# Patient Record
Sex: FEMALE | Race: WHITE | NOT HISPANIC OR LATINO | Employment: UNEMPLOYED | URBAN - METROPOLITAN AREA
[De-identification: names, ages, dates, MRNs, and addresses within clinical notes are randomized per-mention and may not be internally consistent; named-entity substitution may affect disease eponyms.]

---

## 2021-07-09 ENCOUNTER — HOSPITAL ENCOUNTER (EMERGENCY)
Facility: HOSPITAL | Age: 31
Discharge: HOME | End: 2021-07-09
Attending: EMERGENCY MEDICINE | Admitting: EMERGENCY MEDICINE

## 2021-07-09 VITALS
BODY MASS INDEX: 25.61 KG/M2 | RESPIRATION RATE: 18 BRPM | DIASTOLIC BLOOD PRESSURE: 81 MMHG | OXYGEN SATURATION: 100 % | WEIGHT: 150 LBS | HEART RATE: 79 BPM | TEMPERATURE: 98.4 F | SYSTOLIC BLOOD PRESSURE: 137 MMHG | HEIGHT: 64 IN

## 2021-07-09 DIAGNOSIS — H81.11 BENIGN PAROXYSMAL POSITIONAL VERTIGO OF RIGHT EAR: Primary | ICD-10-CM

## 2021-07-09 LAB
ALBUMIN SERPL BCP-MCNC: 3.9 G/DL (ref 3.5–5)
ALP SERPL-CCNC: 69 U/L (ref 46–116)
ALT SERPL W P-5'-P-CCNC: 15 U/L (ref 12–78)
ANION GAP SERPL CALCULATED.3IONS-SCNC: 9 MMOL/L (ref 4–13)
AST SERPL W P-5'-P-CCNC: 13 U/L (ref 5–45)
BASOPHILS # BLD AUTO: 0.03 THOUSANDS/ΜL (ref 0–0.1)
BASOPHILS NFR BLD AUTO: 0 % (ref 0–1)
BILIRUB SERPL-MCNC: 0.69 MG/DL (ref 0.2–1)
BUN SERPL-MCNC: 11 MG/DL (ref 5–25)
CALCIUM SERPL-MCNC: 8.7 MG/DL (ref 8.3–10.1)
CHLORIDE SERPL-SCNC: 105 MMOL/L (ref 100–108)
CO2 SERPL-SCNC: 25 MMOL/L (ref 21–32)
CREAT SERPL-MCNC: 0.61 MG/DL (ref 0.6–1.3)
EOSINOPHIL # BLD AUTO: 0.03 THOUSAND/ΜL (ref 0–0.61)
EOSINOPHIL NFR BLD AUTO: 0 % (ref 0–6)
ERYTHROCYTE [DISTWIDTH] IN BLOOD BY AUTOMATED COUNT: 12.9 % (ref 11.6–15.1)
EXT PREG TEST URINE: NEGATIVE
EXT. CONTROL ED NAV: NORMAL
GFR SERPL CREATININE-BSD FRML MDRD: 123 ML/MIN/1.73SQ M
GLUCOSE SERPL-MCNC: 103 MG/DL (ref 65–140)
HCT VFR BLD AUTO: 39.6 % (ref 34.8–46.1)
HGB BLD-MCNC: 13.1 G/DL (ref 11.5–15.4)
IMM GRANULOCYTES # BLD AUTO: 0.06 THOUSAND/UL (ref 0–0.2)
IMM GRANULOCYTES NFR BLD AUTO: 1 % (ref 0–2)
LYMPHOCYTES # BLD AUTO: 1.07 THOUSANDS/ΜL (ref 0.6–4.47)
LYMPHOCYTES NFR BLD AUTO: 8 % (ref 14–44)
MCH RBC QN AUTO: 31.5 PG (ref 26.8–34.3)
MCHC RBC AUTO-ENTMCNC: 33.1 G/DL (ref 31.4–37.4)
MCV RBC AUTO: 95 FL (ref 82–98)
MONOCYTES # BLD AUTO: 0.37 THOUSAND/ΜL (ref 0.17–1.22)
MONOCYTES NFR BLD AUTO: 3 % (ref 4–12)
NEUTROPHILS # BLD AUTO: 11.71 THOUSANDS/ΜL (ref 1.85–7.62)
NEUTS SEG NFR BLD AUTO: 88 % (ref 43–75)
NRBC BLD AUTO-RTO: 0 /100 WBCS
PLATELET # BLD AUTO: 277 THOUSANDS/UL (ref 149–390)
PMV BLD AUTO: 9.4 FL (ref 8.9–12.7)
POTASSIUM SERPL-SCNC: 4.4 MMOL/L (ref 3.5–5.3)
PROT SERPL-MCNC: 7.9 G/DL (ref 6.4–8.2)
RBC # BLD AUTO: 4.16 MILLION/UL (ref 3.81–5.12)
SODIUM SERPL-SCNC: 139 MMOL/L (ref 136–145)
TROPONIN I SERPL-MCNC: <0.02 NG/ML
WBC # BLD AUTO: 13.27 THOUSAND/UL (ref 4.31–10.16)

## 2021-07-09 PROCEDURE — 36415 COLL VENOUS BLD VENIPUNCTURE: CPT

## 2021-07-09 PROCEDURE — 93005 ELECTROCARDIOGRAM TRACING: CPT

## 2021-07-09 PROCEDURE — 99284 EMERGENCY DEPT VISIT MOD MDM: CPT | Performed by: EMERGENCY MEDICINE

## 2021-07-09 PROCEDURE — 84484 ASSAY OF TROPONIN QUANT: CPT | Performed by: EMERGENCY MEDICINE

## 2021-07-09 PROCEDURE — 99284 EMERGENCY DEPT VISIT MOD MDM: CPT

## 2021-07-09 PROCEDURE — 85025 COMPLETE CBC W/AUTO DIFF WBC: CPT | Performed by: EMERGENCY MEDICINE

## 2021-07-09 PROCEDURE — 80053 COMPREHEN METABOLIC PANEL: CPT | Performed by: EMERGENCY MEDICINE

## 2021-07-09 PROCEDURE — 81025 URINE PREGNANCY TEST: CPT | Performed by: EMERGENCY MEDICINE

## 2021-07-09 RX ORDER — MECLIZINE HCL 12.5 MG/1
12.5 TABLET ORAL 3 TIMES DAILY PRN
Qty: 15 TABLET | Refills: 0 | Status: SHIPPED | OUTPATIENT
Start: 2021-07-09

## 2021-07-09 RX ORDER — MECLIZINE HCL 12.5 MG/1
25 TABLET ORAL ONCE
Status: COMPLETED | OUTPATIENT
Start: 2021-07-09 | End: 2021-07-09

## 2021-07-09 RX ADMIN — MECLIZINE 25 MG: 12.5 TABLET ORAL at 17:40

## 2021-07-09 NOTE — ED PROVIDER NOTES
History  Chief Complaint   Patient presents with    Dizziness     Pt reports dizziness, NV since this am  Denies CP/SOB/HA/fevers        History provided by:  Patient and parent  Dizziness  Quality:  Room spinning  Severity:  Severe  Onset quality:  Sudden  Duration:  10 hours  Timing:  Constant  Progression:  Improving  Chronicity:  New  Context comment:  Woke up this morning stood up, says walking to the bathroom sudden-onset dizziness with sensation of room spinning  Relieved by:  Being still  Worsened by: Movement and turning head (Particularly turning her head to the right)  Associated symptoms: vomiting    Associated symptoms: no chest pain, no diarrhea, no headaches, no nausea, no palpitations and no shortness of breath        None       History reviewed  No pertinent past medical history  Past Surgical History:   Procedure Laterality Date     SECTION         History reviewed  No pertinent family history  I have reviewed and agree with the history as documented  E-Cigarette/Vaping     E-Cigarette/Vaping Substances     Social History     Tobacco Use    Smoking status: Never Smoker    Smokeless tobacco: Never Used   Substance Use Topics    Alcohol use: Not on file    Drug use: Never       Review of Systems   Constitutional: Negative for activity change, chills, diaphoresis and fever  HENT: Negative for congestion, sinus pressure and sore throat  Eyes: Negative for pain and visual disturbance  Respiratory: Negative for cough, chest tightness, shortness of breath, wheezing and stridor  Cardiovascular: Negative for chest pain and palpitations  Gastrointestinal: Positive for vomiting  Negative for abdominal distention, abdominal pain, constipation, diarrhea and nausea  Genitourinary: Negative for dysuria and frequency  Musculoskeletal: Negative for neck pain and neck stiffness  Skin: Negative for rash  Neurological: Positive for dizziness   Negative for speech difficulty, light-headedness, numbness and headaches  Physical Exam  Physical Exam  Vitals reviewed  Constitutional:       General: She is not in acute distress  Appearance: She is well-developed  She is not diaphoretic  HENT:      Head: Normocephalic and atraumatic  Right Ear: External ear normal       Left Ear: External ear normal       Nose: Nose normal    Eyes:      General:         Right eye: No discharge  Left eye: No discharge  Pupils: Pupils are equal, round, and reactive to light  Neck:      Trachea: No tracheal deviation  Cardiovascular:      Rate and Rhythm: Normal rate and regular rhythm  Heart sounds: Normal heart sounds  No murmur heard  Pulmonary:      Effort: Pulmonary effort is normal  No respiratory distress  Breath sounds: Normal breath sounds  No stridor  Abdominal:      General: There is no distension  Palpations: Abdomen is soft  Tenderness: There is no abdominal tenderness  There is no guarding or rebound  Musculoskeletal:         General: Normal range of motion  Cervical back: Normal range of motion and neck supple  Skin:     General: Skin is warm and dry  Coloration: Skin is not pale  Findings: No erythema  Neurological:      General: No focal deficit present  Mental Status: She is alert and oriented to person, place, and time        Comments: [place]-Hallpike maneuver positive to the right         Vital Signs  ED Triage Vitals   Temperature Pulse Respirations Blood Pressure SpO2   07/09/21 1621 07/09/21 1621 07/09/21 1621 07/09/21 1621 07/09/21 1621   98 4 °F (36 9 °C) 79 18 137/81 100 %      Temp Source Heart Rate Source Patient Position - Orthostatic VS BP Location FiO2 (%)   07/09/21 1621 07/09/21 1621 07/09/21 1621 07/09/21 1621 --   Oral Monitor Sitting Left arm       Pain Score       07/09/21 1622       No Pain           Vitals:    07/09/21 1621   BP: 137/81   Pulse: 79   Patient Position - Orthostatic VS: Sitting Visual Acuity  Visual Acuity      Most Recent Value   L Pupil Size (mm)  3   R Pupil Size (mm)  3          ED Medications  Medications   meclizine (ANTIVERT) tablet 25 mg (25 mg Oral Given 7/9/21 1740)       Diagnostic Studies  Results Reviewed     Procedure Component Value Units Date/Time    POCT pregnancy, urine [[id number]]  (Normal) Resulted: 07/09/21 1730    Lab Status: Final result Updated: 07/09/21 1730     EXT PREG TEST UR (Ref: Negative) negative     Control valid    Comprehensive metabolic panel [[id number]] Collected: 07/09/21 1624    Lab Status: Final result Specimen: Blood from Arm, Right Updated: 07/09/21 1657     Sodium 139 mmol/L      Potassium 4 4 mmol/L      Chloride 105 mmol/L      CO2 25 mmol/L      ANION GAP 9 mmol/L      BUN 11 mg/dL      Creatinine 0 61 mg/dL      Glucose 103 mg/dL      Calcium 8 7 mg/dL      AST 13 U/L      ALT 15 U/L      Alkaline Phosphatase 69 U/L      Total Protein 7 9 g/dL      Albumin 3 9 g/dL      Total Bilirubin 0 69 mg/dL      eGFR 123 ml/min/1 73sq m     Narrative:      Jef guidelines for Chronic Kidney Disease (CKD):     Stage 1 with normal or high GFR (GFR > 90 mL/min/1 73 square meters)    Stage 2 Mild CKD (GFR = 60-89 mL/min/1 73 square meters)    Stage 3A Moderate CKD (GFR = 45-59 mL/min/1 73 square meters)    Stage 3B Moderate CKD (GFR = 30-44 mL/min/1 73 square meters)    Stage 4 Severe CKD (GFR = 15-29 mL/min/1 73 square meters)    Stage 5 End Stage CKD (GFR <15 mL/min/1 73 square meters)  Note: GFR calculation is accurate only with a steady state creatinine    Troponin I [[id number]]  (Normal) Collected: 07/09/21 1624    Lab Status: Final result Specimen: Blood from Arm, Right Updated: 07/09/21 1653     Troponin I <0 02 ng/mL     CBC and differential [[id number]]  (Abnormal) Collected: 07/09/21 1624    Lab Status: Final result Specimen: Blood from Arm, Right Updated: 07/09/21 1631     WBC 13 27 Thousand/uL      RBC 4 16 Million/uL      Hemoglobin 13 1 g/dL      Hematocrit 39 6 %      MCV 95 fL      MCH 31 5 pg      MCHC 33 1 g/dL      RDW 12 9 %      MPV 9 4 fL      Platelets 703 Thousands/uL      nRBC 0 /100 WBCs      Neutrophils Relative 88 %      Immat GRANS % 1 %      Lymphocytes Relative 8 %      Monocytes Relative 3 %      Eosinophils Relative 0 %      Basophils Relative 0 %      Neutrophils Absolute 11 71 Thousands/µL      Immature Grans Absolute 0 06 Thousand/uL      Lymphocytes Absolute 1 07 Thousands/µL      Monocytes Absolute 0 37 Thousand/µL      Eosinophils Absolute 0 03 Thousand/µL      Basophils Absolute 0 03 Thousands/µL                  No orders to display              Procedures  Procedures         ED Course                             SBIRT 20yo+      Most Recent Value   SBIRT (24 yo +)   In order to provide better care to our patients, we are screening all of our patients for alcohol and drug use  Would it be okay to ask you these screening questions? Unable to answer at this time Filed at: 07/09/2021 1503                    [facility]  Number of Diagnoses or Management Options  Benign paroxysmal positional vertigo of right ear: new and requires workup  Diagnosis management comments: [place]-Hallpike maneuver positive to the right, fully reproduces symptoms, will discharge on Antivert         Amount and/or Complexity of Data Reviewed  Clinical lab tests: ordered and reviewed  Decide to obtain previous medical records or to obtain history from someone other than the patient: yes  Obtain history from someone other than the patient: yes  Review and summarize past medical records: yes        Disposition  Final diagnoses:   Benign paroxysmal positional vertigo of right ear     Time reflects when diagnosis was documented in both MDM as applicable and the Disposition within this note     Time User Action Codes Description Comment    7/9/2021  5:31 PM Luisito Matute Add [H81 11] Benign paroxysmal positional vertigo of right ear       ED Disposition     ED Disposition Condition Date/Time Comment    Discharge Stable Fri Jul 9, 2021  5:31 PM Leti Barrios discharge to home/self care  Follow-up Information     Follow up With Specialties Details Why Contact Info Additional Information    Physical Therapy at [address] [place] [address] Physical Therapy Call  If symptoms worsen for vestibular therapy Lawrence Lal 77  [phone] Physical Therapy at [address] [place] [address], [facility] [address]          Discharge Medication List as of 7/9/2021  5:33 PM      START taking these medications    Details   meclizine (ANTIVERT) 12 5 MG tablet Take 1 tablet (12 5 mg total) by mouth 3 (three) times a day as needed for dizziness, Starting Fri 7/9/2021, Print           No discharge procedures on file      PDMP Review     None          ED Provider  Electronically Signed by           Aria Castillo DO  07/09/21 2046

## 2021-07-11 LAB
ATRIAL RATE: 87 BPM
P AXIS: 52 DEGREES
PR INTERVAL: 114 MS
QRS AXIS: 76 DEGREES
QRSD INTERVAL: 72 MS
QT INTERVAL: 342 MS
QTC INTERVAL: 402 MS
T WAVE AXIS: 52 DEGREES
VENTRICULAR RATE: 83 BPM

## 2021-07-11 PROCEDURE — 93010 ELECTROCARDIOGRAM REPORT: CPT | Performed by: INTERNAL MEDICINE

## 2021-08-04 ENCOUNTER — OFFICE VISIT (OUTPATIENT)
Dept: FAMILY MEDICINE CLINIC | Facility: CLINIC | Age: 31
End: 2021-08-04

## 2021-08-04 VITALS
OXYGEN SATURATION: 98 % | SYSTOLIC BLOOD PRESSURE: 115 MMHG | HEART RATE: 79 BPM | WEIGHT: 158.2 LBS | HEIGHT: 64 IN | TEMPERATURE: 98.2 F | BODY MASS INDEX: 27.01 KG/M2 | RESPIRATION RATE: 20 BRPM | DIASTOLIC BLOOD PRESSURE: 80 MMHG

## 2021-08-04 DIAGNOSIS — R42 VERTIGO: ICD-10-CM

## 2021-08-04 DIAGNOSIS — Z11.4 ENCOUNTER FOR SCREENING FOR HIV: ICD-10-CM

## 2021-08-04 DIAGNOSIS — E78.2 MIXED HYPERLIPIDEMIA: ICD-10-CM

## 2021-08-04 DIAGNOSIS — Z11.59 ENCOUNTER FOR HEPATITIS C SCREENING TEST FOR LOW RISK PATIENT: ICD-10-CM

## 2021-08-04 DIAGNOSIS — R53.83 OTHER FATIGUE: ICD-10-CM

## 2021-08-04 DIAGNOSIS — R73.9 HYPERGLYCEMIA: ICD-10-CM

## 2021-08-04 DIAGNOSIS — Z12.4 PAP SMEAR FOR CERVICAL CANCER SCREENING: ICD-10-CM

## 2021-08-04 DIAGNOSIS — H61.21 IMPACTED CERUMEN OF RIGHT EAR: Primary | ICD-10-CM

## 2021-08-04 PROBLEM — Z23 NEED FOR TDAP VACCINATION: Status: ACTIVE | Noted: 2021-08-04

## 2021-08-04 PROCEDURE — 99204 OFFICE O/P NEW MOD 45 MIN: CPT | Performed by: NURSE PRACTITIONER

## 2021-08-04 NOTE — ASSESSMENT & PLAN NOTE
Based on symptoms I am recommending movements that help relieve the symptoms and keep the dizziness from returning  These exercises help move the calcium pieces to a different part of the patients ear  Advised patient to avoid sudden head movements and raise and support their head when they lie down  Also recommended to change position often when she is lying down in bed

## 2021-08-04 NOTE — ASSESSMENT & PLAN NOTE
The primary prevention discussed with patient is to avoid using cotton-tipped applicators in ears due to unintended consequence of pushing cerumen back and causing an impaction  Discussed use of ear buds or hearing aids can also cause this to recur      -given debrox drops and return for re-evaluation

## 2021-08-04 NOTE — PROGRESS NOTES
BMI Counseling: Body mass index is 27 15 kg/m²  The BMI is above normal  Nutrition recommendations include encouraging healthy choices of fruits and vegetables, decreasing fast food intake, consuming healthier snacks, limiting drinks that contain sugar, increasing intake of lean protein, reducing intake of saturated and trans fat and reducing intake of cholesterol  Exercise recommendations include exercising 3-5 times per week  Depression Screening and Follow-up Plan: Patient's depression screening was positive with a PHQ-2 score of 0  Clincally patient does not have depression  No treatment is required  Assessment/Plan:    Impacted cerumen of right ear  The primary prevention discussed with patient is to avoid using cotton-tipped applicators in ears due to unintended consequence of pushing cerumen back and causing an impaction  Discussed use of ear buds or hearing aids can also cause this to recur      -given debrox drops and return for re-evaluation  Vertigo  Based on symptoms I am recommending movements that help relieve the symptoms and keep the dizziness from returning  These exercises help move the calcium pieces to a different part of the patients ear  Advised patient to avoid sudden head movements and raise and support their head when they lie down  Also recommended to change position often when she is lying down in bed  Diagnoses and all orders for this visit:    Impacted cerumen of right ear  -     carbamide peroxide (DEBROX) 6 5 % otic solution; Administer 5 drops to the right ear 2 (two) times a day for 4 days    Vertigo    Pap smear for cervical cancer screening  -     Ambulatory referral to Gynecology; Future    Encounter for screening for HIV    Encounter for hepatitis C screening test for low risk patient    Other fatigue  -     CBC and differential; Future  -     TSH, 3rd generation with Free T4 reflex; Future    Hyperglycemia  -     Comprehensive metabolic panel;  Future    Mixed hyperlipidemia  -     Lipid panel; Future    Other orders  -     Cancel: Tdap Vaccine greater than or equal to 6yo  -     Cancel: HIV 1/2 Antigen/Antibody (4th Generation) w Reflex [facility]; Future  -     Cancel: Hepatitis C antibody; Future          Subjective:      Patient ID: Lawrence Judge is a 36 y o  female  36year old female patient here to establish care  Did not have a PCP in the past  PMHx: vertigo  States she was in the ER due to vertigo and was given meclizine but it makes her sleepy  Pt states when lying states she gets more vertigo or when she is laying down on the side  The sensation in her stomach like itchy she states  Denies headaches  Admits to feeling like she has water in her ears and has to come out is what she feels  Admits to having allergies  Does not take anti-histamines everyday  Dizziness  This is a recurrent problem  The current episode started more than 1 month ago  The problem occurs intermittently  The problem has been waxing and waning  Associated symptoms include nausea and vertigo  Pertinent negatives include no change in bowel habit, chest pain, congestion, coughing, headaches, sore throat or vomiting  The symptoms are aggravated by twisting and bending (laying down)  Treatments tried: meclzine  The treatment provided significant relief  The following portions of the patient's history were reviewed and updated as appropriate: allergies, current medications, past family history, past medical history, past social history, past surgical history and problem list     Review of Systems   Constitutional: Negative  HENT: Negative  Negative for congestion and sore throat  Eyes: Negative  Respiratory: Negative  Negative for cough, chest tightness and wheezing  Cardiovascular: Negative  Negative for chest pain  Gastrointestinal: Positive for nausea  Negative for change in bowel habit and vomiting  Endocrine: Negative  Genitourinary: Negative  Musculoskeletal: Negative  Skin: Negative  Allergic/Immunologic: Negative  Neurological: Positive for dizziness and vertigo  Negative for headaches  Hematological: Negative  Psychiatric/Behavioral: Negative  Objective:      /80 (BP Location: Left arm, Patient Position: Sitting, Cuff Size: Standard)   Pulse 79   Temp 98 2 °F (36 8 °C) (Temporal)   Resp 20   Ht 5' 4" (1 626 m)   Wt 71 8 kg (158 lb 3 2 oz)   LMP 07/19/2021 (Exact Date)   SpO2 98%   BMI 27 15 kg/m²          Physical Exam  Vitals and nursing note reviewed  Constitutional:       Appearance: Normal appearance  She is well-developed  HENT:      Head: Normocephalic and atraumatic  Right Ear: Hearing and external ear normal  No decreased hearing noted  There is impacted cerumen  Left Ear: Hearing, tympanic membrane, ear canal and external ear normal  No decreased hearing noted  Nose: Nose normal       Mouth/Throat:      Pharynx: Oropharynx is clear  Eyes:      Conjunctiva/sclera: Conjunctivae normal    Cardiovascular:      Rate and Rhythm: Normal rate and regular rhythm  Heart sounds: Normal heart sounds  Pulmonary:      Effort: Pulmonary effort is normal  No respiratory distress  Breath sounds: Normal breath sounds  No wheezing or rales  Musculoskeletal:         General: Normal range of motion  Cervical back: Normal range of motion and neck supple  Lymphadenopathy:      Cervical: No cervical adenopathy  Skin:     General: Skin is warm and dry  Neurological:      Mental Status: She is alert and oriented to person, place, and time  Deep Tendon Reflexes: Reflexes are normal and symmetric     Psychiatric:         Mood and Affect: Mood normal          Behavior: Behavior normal

## 2021-08-10 ENCOUNTER — OFFICE VISIT (OUTPATIENT)
Dept: FAMILY MEDICINE CLINIC | Facility: CLINIC | Age: 31
End: 2021-08-10

## 2021-08-10 VITALS
RESPIRATION RATE: 20 BRPM | HEART RATE: 92 BPM | SYSTOLIC BLOOD PRESSURE: 118 MMHG | HEIGHT: 64 IN | WEIGHT: 160.2 LBS | TEMPERATURE: 97.1 F | DIASTOLIC BLOOD PRESSURE: 70 MMHG | OXYGEN SATURATION: 98 % | BODY MASS INDEX: 27.35 KG/M2

## 2021-08-10 DIAGNOSIS — R42 VERTIGO: Primary | ICD-10-CM

## 2021-08-10 DIAGNOSIS — H61.21 IMPACTED CERUMEN OF RIGHT EAR: ICD-10-CM

## 2021-08-10 PROCEDURE — 99213 OFFICE O/P EST LOW 20 MIN: CPT | Performed by: NURSE PRACTITIONER

## 2021-08-10 NOTE — ASSESSMENT & PLAN NOTE
-pt verbalized using debrox drops and seeing cerumen after use  Continues to have spells of dizziness  I advised her CBC was normal and she is not anemic  To practice vertigo exercises we discussed  On exam both ears were normal  No signs of infection or fluid  I recommended f/u with ENT  Referral provided today

## 2021-08-10 NOTE — ASSESSMENT & PLAN NOTE
-pt to continue to practice anti-vertigo exercises as instructed  Her ear exam was unremarkable post cerumen removal with debrox drops  Pt states meclizine givein in ER did not work so she stopped this  She can follow up with ENT  Referral provided

## 2021-08-10 NOTE — PROGRESS NOTES
Assessment/Plan:    Impacted cerumen of right ear  -pt verbalized using debrox drops and seeing cerumen after use  Continues to have spells of dizziness  I advised her CBC was normal and she is not anemic  To practice vertigo exercises we discussed  On exam both ears were normal  No signs of infection or fluid  I recommended f/u with ENT  Referral provided today  Vertigo  -pt to continue to practice anti-vertigo exercises as instructed  Her ear exam was unremarkable post cerumen removal with debrox drops  Pt states meclizine givein in ER did not work so she stopped this  She can follow up with ENT  Referral provided  Diagnoses and all orders for this visit:    Vertigo  -     Ambulatory Referral to Otolaryngology; Future    Impacted cerumen of right ear          Subjective:      Patient ID: Alisson Palafox is a 36 y o  female  36year old female patient here for follow up on cerumen impaction  States she used the irrigation OTC and on Thursday the cerumen came out, states it was a big piece of ear wax  States she still is feeling vertigo even after irrigation  No change per patient  Denies any tinnitus  She is hearing well without complaints  She did not use meclizine ER gave her as this does not work for her  Would like to know what is the cause of her off balance  The following portions of the patient's history were reviewed and updated as appropriate: allergies, current medications, past family history, past medical history, past social history, past surgical history and problem list     Review of Systems   Constitutional: Negative  Negative for appetite change and fever  HENT: Negative  Eyes: Negative  Respiratory: Negative  Negative for cough, chest tightness, shortness of breath and wheezing  Cardiovascular: Negative  Negative for chest pain and palpitations  Gastrointestinal: Negative  Endocrine: Negative  Genitourinary: Negative      Musculoskeletal: Negative  Skin: Negative  Allergic/Immunologic: Negative  Neurological: Positive for dizziness  Negative for headaches  Hematological: Negative  Psychiatric/Behavioral: Negative  Objective:      /70 (BP Location: Left arm, Patient Position: Sitting, Cuff Size: Standard)   Pulse 92   Temp (!) 97 1 °F (36 2 °C) (Temporal)   Resp 20   Ht 5' 4" (1 626 m)   Wt 72 7 kg (160 lb 3 2 oz)   LMP 07/19/2021 (Exact Date)   SpO2 98%   BMI 27 50 kg/m²          Physical Exam  Vitals and nursing note reviewed  Constitutional:       General: She is not in acute distress  Appearance: Normal appearance  She is not ill-appearing  HENT:      Head: Normocephalic and atraumatic  Right Ear: Tympanic membrane, ear canal and external ear normal  There is no impacted cerumen  Left Ear: Tympanic membrane, ear canal and external ear normal  There is no impacted cerumen  Nose: Nose normal  No congestion or rhinorrhea  Mouth/Throat:      Pharynx: Oropharynx is clear  No oropharyngeal exudate or posterior oropharyngeal erythema  Eyes:      Conjunctiva/sclera: Conjunctivae normal    Cardiovascular:      Rate and Rhythm: Normal rate and regular rhythm  Pulses: Normal pulses  Heart sounds: Normal heart sounds  No murmur heard  No friction rub  Pulmonary:      Effort: Pulmonary effort is normal  No respiratory distress  Breath sounds: Normal breath sounds  Musculoskeletal:         General: No tenderness  Normal range of motion  Cervical back: Normal range of motion and neck supple  No rigidity  No muscular tenderness  Skin:     General: Skin is warm and dry  Capillary Refill: Capillary refill takes less than 2 seconds  Neurological:      General: No focal deficit present  Mental Status: She is alert and oriented to person, place, and time     Psychiatric:         Mood and Affect: Mood normal          Behavior: Behavior normal

## 2022-10-12 PROBLEM — H61.21 IMPACTED CERUMEN OF RIGHT EAR: Status: RESOLVED | Noted: 2021-08-04 | Resolved: 2022-10-12

## 2023-11-12 ENCOUNTER — HOSPITAL ENCOUNTER (EMERGENCY)
Facility: HOSPITAL | Age: 33
Discharge: HOME | End: 2023-11-12
Attending: EMERGENCY MEDICINE

## 2023-11-12 ENCOUNTER — APPOINTMENT (EMERGENCY)
Dept: RADIOLOGY | Facility: HOSPITAL | Age: 33
End: 2023-11-12

## 2023-11-12 VITALS
OXYGEN SATURATION: 99 % | HEART RATE: 88 BPM | SYSTOLIC BLOOD PRESSURE: 128 MMHG | TEMPERATURE: 97.9 F | RESPIRATION RATE: 16 BRPM | DIASTOLIC BLOOD PRESSURE: 84 MMHG

## 2023-11-12 DIAGNOSIS — N30.91 HEMORRHAGIC CYSTITIS: Primary | ICD-10-CM

## 2023-11-12 LAB
ANION GAP SERPL CALCULATED.3IONS-SCNC: 5 MMOL/L
BACTERIA UR QL AUTO: ABNORMAL /HPF
BASOPHILS # BLD AUTO: 0.04 THOUSANDS/ÂΜL (ref 0–0.1)
BASOPHILS NFR BLD AUTO: 0 % (ref 0–1)
BILIRUB UR QL STRIP: NEGATIVE
BUN SERPL-MCNC: 9 MG/DL (ref 5–25)
CALCIUM SERPL-MCNC: 9.2 MG/DL (ref 8.4–10.2)
CHLORIDE SERPL-SCNC: 104 MMOL/L (ref 96–108)
CLARITY UR: ABNORMAL
CO2 SERPL-SCNC: 28 MMOL/L (ref 21–32)
COLOR UR: ABNORMAL
CREAT SERPL-MCNC: 0.66 MG/DL (ref 0.6–1.3)
EOSINOPHIL # BLD AUTO: 0.25 THOUSAND/ÂΜL (ref 0–0.61)
EOSINOPHIL NFR BLD AUTO: 2 % (ref 0–6)
ERYTHROCYTE [DISTWIDTH] IN BLOOD BY AUTOMATED COUNT: 12.6 % (ref 11.6–15.1)
EXT PREGNANCY TEST URINE: NEGATIVE
EXT. CONTROL: NORMAL
GFR SERPL CREATININE-BSD FRML MDRD: 118 ML/MIN/1.73SQ M
GLUCOSE SERPL-MCNC: 91 MG/DL (ref 65–140)
GLUCOSE UR STRIP-MCNC: NEGATIVE MG/DL
HCT VFR BLD AUTO: 37 % (ref 34.8–46.1)
HGB BLD-MCNC: 12.6 G/DL (ref 11.5–15.4)
HGB UR QL STRIP.AUTO: ABNORMAL
IMM GRANULOCYTES # BLD AUTO: 0.04 THOUSAND/UL (ref 0–0.2)
IMM GRANULOCYTES NFR BLD AUTO: 0 % (ref 0–2)
KETONES UR STRIP-MCNC: NEGATIVE MG/DL
LEUKOCYTE ESTERASE UR QL STRIP: ABNORMAL
LYMPHOCYTES # BLD AUTO: 3.02 THOUSANDS/ÂΜL (ref 0.6–4.47)
LYMPHOCYTES NFR BLD AUTO: 25 % (ref 14–44)
MCH RBC QN AUTO: 31.9 PG (ref 26.8–34.3)
MCHC RBC AUTO-ENTMCNC: 34.1 G/DL (ref 31.4–37.4)
MCV RBC AUTO: 94 FL (ref 82–98)
MONOCYTES # BLD AUTO: 0.6 THOUSAND/ÂΜL (ref 0.17–1.22)
MONOCYTES NFR BLD AUTO: 5 % (ref 4–12)
NEUTROPHILS # BLD AUTO: 8.08 THOUSANDS/ÂΜL (ref 1.85–7.62)
NEUTS SEG NFR BLD AUTO: 68 % (ref 43–75)
NITRITE UR QL STRIP: NEGATIVE
NON-SQ EPI CELLS URNS QL MICRO: ABNORMAL /HPF
NRBC BLD AUTO-RTO: 0 /100 WBCS
PH UR STRIP.AUTO: 6 [PH]
PLATELET # BLD AUTO: 297 THOUSANDS/UL (ref 149–390)
PMV BLD AUTO: 9.3 FL (ref 8.9–12.7)
POTASSIUM SERPL-SCNC: 3.7 MMOL/L (ref 3.5–5.3)
PROT UR STRIP-MCNC: ABNORMAL MG/DL
RBC # BLD AUTO: 3.95 MILLION/UL (ref 3.81–5.12)
RBC #/AREA URNS AUTO: ABNORMAL /HPF
SODIUM SERPL-SCNC: 137 MMOL/L (ref 135–147)
SP GR UR STRIP.AUTO: 1.02 (ref 1–1.03)
UROBILINOGEN UR STRIP-ACNC: <2 MG/DL
WBC # BLD AUTO: 12.03 THOUSAND/UL (ref 4.31–10.16)
WBC #/AREA URNS AUTO: ABNORMAL /HPF

## 2023-11-12 PROCEDURE — 80048 BASIC METABOLIC PNL TOTAL CA: CPT

## 2023-11-12 PROCEDURE — 87077 CULTURE AEROBIC IDENTIFY: CPT | Performed by: EMERGENCY MEDICINE

## 2023-11-12 PROCEDURE — 96365 THER/PROPH/DIAG IV INF INIT: CPT

## 2023-11-12 PROCEDURE — 96375 TX/PRO/DX INJ NEW DRUG ADDON: CPT

## 2023-11-12 PROCEDURE — 99284 EMERGENCY DEPT VISIT MOD MDM: CPT

## 2023-11-12 PROCEDURE — 36415 COLL VENOUS BLD VENIPUNCTURE: CPT

## 2023-11-12 PROCEDURE — 96366 THER/PROPH/DIAG IV INF ADDON: CPT

## 2023-11-12 PROCEDURE — 87086 URINE CULTURE/COLONY COUNT: CPT | Performed by: EMERGENCY MEDICINE

## 2023-11-12 PROCEDURE — 81025 URINE PREGNANCY TEST: CPT | Performed by: EMERGENCY MEDICINE

## 2023-11-12 PROCEDURE — 74177 CT ABD & PELVIS W/CONTRAST: CPT

## 2023-11-12 PROCEDURE — 85025 COMPLETE CBC W/AUTO DIFF WBC: CPT

## 2023-11-12 PROCEDURE — 81001 URINALYSIS AUTO W/SCOPE: CPT | Performed by: EMERGENCY MEDICINE

## 2023-11-12 PROCEDURE — G1004 CDSM NDSC: HCPCS

## 2023-11-12 PROCEDURE — 87186 SC STD MICRODIL/AGAR DIL: CPT | Performed by: EMERGENCY MEDICINE

## 2023-11-12 PROCEDURE — 99284 EMERGENCY DEPT VISIT MOD MDM: CPT | Performed by: EMERGENCY MEDICINE

## 2023-11-12 RX ORDER — SODIUM CHLORIDE, SODIUM GLUCONATE, SODIUM ACETATE, POTASSIUM CHLORIDE, MAGNESIUM CHLORIDE, SODIUM PHOSPHATE, DIBASIC, AND POTASSIUM PHOSPHATE .53; .5; .37; .037; .03; .012; .00082 G/100ML; G/100ML; G/100ML; G/100ML; G/100ML; G/100ML; G/100ML
1000 INJECTION, SOLUTION INTRAVENOUS ONCE
Status: COMPLETED | OUTPATIENT
Start: 2023-11-12 | End: 2023-11-12

## 2023-11-12 RX ORDER — CEFPODOXIME PROXETIL 200 MG/1
200 TABLET, FILM COATED ORAL ONCE
Status: DISCONTINUED | OUTPATIENT
Start: 2023-11-12 | End: 2023-11-12 | Stop reason: HOSPADM

## 2023-11-12 RX ORDER — KETOROLAC TROMETHAMINE 30 MG/ML
15 INJECTION, SOLUTION INTRAMUSCULAR; INTRAVENOUS ONCE
Status: COMPLETED | OUTPATIENT
Start: 2023-11-12 | End: 2023-11-12

## 2023-11-12 RX ORDER — CEFPODOXIME PROXETIL 200 MG/1
200 TABLET, FILM COATED ORAL 2 TIMES DAILY
Qty: 28 TABLET | Refills: 0 | Status: SHIPPED | OUTPATIENT
Start: 2023-11-12 | End: 2023-11-26

## 2023-11-12 RX ADMIN — IOHEXOL 100 ML: 350 INJECTION, SOLUTION INTRAVENOUS at 17:56

## 2023-11-12 RX ADMIN — KETOROLAC TROMETHAMINE 15 MG: 30 INJECTION, SOLUTION INTRAMUSCULAR; INTRAVENOUS at 17:02

## 2023-11-12 RX ADMIN — SODIUM CHLORIDE, SODIUM GLUCONATE, SODIUM ACETATE, POTASSIUM CHLORIDE, MAGNESIUM CHLORIDE, SODIUM PHOSPHATE, DIBASIC, AND POTASSIUM PHOSPHATE 1000 ML: .53; .5; .37; .037; .03; .012; .00082 INJECTION, SOLUTION INTRAVENOUS at 17:02

## 2023-11-14 LAB
BACTERIA UR CULT: ABNORMAL
BACTERIA UR CULT: ABNORMAL

## 2023-11-14 NOTE — ED PROVIDER NOTES
Final Diagnoses:     1. Hemorrhagic cystitis        ED Course as of 23 0149   Sun 2023   1715 Blood, UA(!): Moderate   1742 Patient pain not improved with Toradol has a white count of and aggressive hematuria will order CT at this time. Nursing Triage:     Chief Complaint   Patient presents with    Blood in Urine     Around Monday noticed blood in urine "blood clots", intermittently odorous, b/l flank pain, burning sensation. Denies fevers      HPI:   This is a 34 y.o. female presenting for evaluation of blood in urine and abdominal pain. Relevant past medical history . Patient states earlier this week off Monday that she was having some red in her urine and has occasionally had some clots. She states that she also has left-sided flank pain and some discomfort in terms of burning when she urinates she does have severe suprapubic pain when urinating that is not burning as well. Patient states that she is sure that the blood is in her urine. She denies any other symptoms at this time including fever, chills, chest pain, shortness of breath, N/V/D, vaginal discharge, vaginal bleeding. ASSESSMENT + PLAN:   Concern for kidney stone versus hemorrhagic cystitis versus UTI versus pyelonephritis. We will perform basic labs in addition to UA and urinary pregnancy. Patient diagnosed with hemorrhagic cystitis. We will start her on p.o. antibiotics and have her follow-up with her PCP. Patient is agreeable to this plan. Patient given follow-up precautions and return precautions. Physical:   Physical Exam  Vitals and nursing note reviewed. Constitutional:       Appearance: Normal appearance. HENT:      Head: Normocephalic and atraumatic. Nose: Nose normal.      Mouth/Throat:      Mouth: Mucous membranes are moist.      Pharynx: No oropharyngeal exudate or posterior oropharyngeal erythema. Eyes:      Extraocular Movements: Extraocular movements intact.       Conjunctiva/sclera: Conjunctivae normal.      Pupils: Pupils are equal, round, and reactive to light. Cardiovascular:      Rate and Rhythm: Normal rate and regular rhythm. Pulses: Normal pulses. Heart sounds: Normal heart sounds. Pulmonary:      Effort: Pulmonary effort is normal.      Breath sounds: Normal breath sounds. Abdominal:      General: Abdomen is flat. There is no distension. Palpations: Abdomen is soft. Tenderness: There is abdominal tenderness. There is left CVA tenderness. Musculoskeletal:         General: Normal range of motion. Cervical back: Normal range of motion. Lymphadenopathy:      Cervical: No cervical adenopathy. Skin:     General: Skin is warm and dry. Capillary Refill: Capillary refill takes less than 2 seconds. Neurological:      General: No focal deficit present. Mental Status: She is alert and oriented to person, place, and time. Cranial Nerves: No cranial nerve deficit. Sensory: No sensory deficit. Psychiatric:         Mood and Affect: Mood normal.         Behavior: Behavior normal.         Vitals:    23 1529   BP: 128/84   BP Location: Right arm   Pulse: 88   Resp: 16   Temp: 97.9 °F (36.6 °C)   TempSrc: Oral   SpO2: 99%     No results found for: "POCGLU"    - There are no obvious limitations to social determinants of care. - Nursing note reviewed. - Vitals reviewed. - Orders placed by myself and/or advanced practitioner / resident.    - Previous chart was reviewed  - No language barrier.   - History obtained from patient. - There are no limitations to the history obtained:     Past Medical:    has no past medical history on file. Past Surgical:    has a past surgical history that includes  section (2019).     Social:     Social History     Substance and Sexual Activity   Alcohol Use Never     Social History     Tobacco Use   Smoking Status Never   Smokeless Tobacco Never     Social History     Substance and Sexual Activity   Drug Use Never       Echo:   No results found for this or any previous visit. No results found for this or any previous visit. Cath:    No results found for this or any previous visit. Code Status: No Order  Advance Directive and Living Will:      Power of :    POLST:    Medications   ketorolac (TORADOL) injection 15 mg (15 mg Intravenous Given 11/12/23 1702)   multi-electrolyte (ISOLYTE-S PH 7.4) bolus 1,000 mL (0 mL Intravenous Stopped 11/12/23 1911)   iohexol (OMNIPAQUE) 350 MG/ML injection (SINGLE-DOSE) 100 mL (100 mL Intravenous Given 11/12/23 1756)     CT abdomen pelvis with contrast   Final Result      Severe bladder wall thickening consistent with cystitis.             Workstation performed: [id number]           Orders Placed This Encounter   Procedures    Urine culture    CT abdomen pelvis with contrast    UA w Reflex to Microscopic w Reflex to Culture    Urine Microscopic    CBC and differential    Basic metabolic panel    POCT pregnancy, urine     Labs Reviewed   UA W REFLEX TO MICROSCOPIC WITH REFLEX TO CULTURE - Abnormal       Result Value Ref Range Status    Color, UA Light Yellow   Final    Clarity, UA Hazy   Final    Specific Gravity, UA 1.016  1.003 - 1.030 Final    pH, UA 6.0  4.5, 5.0, 5.5, 6.0, 6.5, 7.0, 7.5, 8.0 Final    Leukocytes, UA Large (*) Negative Final    Nitrite, UA Negative  Negative Final    Protein, UA 30 (1+) (*) Negative mg/dl Final    Glucose, UA Negative  Negative mg/dl Final    Ketones, UA Negative  Negative mg/dl Final    Urobilinogen, UA <2.0  <2.0 mg/dl mg/dl Final    Bilirubin, UA Negative  Negative Final    Occult Blood, UA Moderate (*) Negative Final   URINE MICROSCOPIC - Abnormal    RBC, UA Innumerable (*) None Seen, 1-2 /hpf Final    WBC, UA Innumerable (*) None Seen, 1-2 /hpf Final    Epithelial Cells Occasional  None Seen, Occasional /hpf Final    Bacteria, UA None Seen  None Seen, Occasional /hpf Final   CBC AND DIFFERENTIAL - Abnormal WBC 12.03 (*) 4.31 - 10.16 Thousand/uL Final    RBC 3.95  3.81 - 5.12 Million/uL Final    Hemoglobin 12.6  11.5 - 15.4 g/dL Final    Hematocrit 37.0  34.8 - 46.1 % Final    MCV 94  82 - 98 fL Final    MCH 31.9  26.8 - 34.3 pg Final    MCHC 34.1  31.4 - 37.4 g/dL Final    RDW 12.6  11.6 - 15.1 % Final    MPV 9.3  8.9 - 12.7 fL Final    Platelets 529  973 - 390 Thousands/uL Final    nRBC 0  /100 WBCs Final    Neutrophils Relative 68  43 - 75 % Final    Immat GRANS % 0  0 - 2 % Final    Lymphocytes Relative 25  14 - 44 % Final    Monocytes Relative 5  4 - 12 % Final    Eosinophils Relative 2  0 - 6 % Final    Basophils Relative 0  0 - 1 % Final    Neutrophils Absolute 8.08 (*) 1.85 - 7.62 Thousands/µL Final    Immature Grans Absolute 0.04  0.00 - 0.20 Thousand/uL Final    Lymphocytes Absolute 3.02  0.60 - 4.47 Thousands/µL Final    Monocytes Absolute 0.60  0.17 - 1.22 Thousand/µL Final    Eosinophils Absolute 0.25  0.00 - 0.61 Thousand/µL Final    Basophils Absolute 0.04  0.00 - 0.10 Thousands/µL Final   POCT PREGNANCY, URINE - Normal    EXT Preg Test, Ur Negative   Final    Control Valid   Final   BASIC METABOLIC PANEL    Sodium 758  135 - 147 mmol/L Final    Potassium 3.7  3.5 - 5.3 mmol/L Final    Chloride 104  96 - 108 mmol/L Final    CO2 28  21 - 32 mmol/L Final    ANION GAP 5  mmol/L Final    BUN 9  5 - 25 mg/dL Final    Creatinine 0.66  0.60 - 1.30 mg/dL Final    Comment: Standardized to IDMS reference method    Glucose 91  65 - 140 mg/dL Final    Comment: If the patient is fasting, the ADA then defines impaired fasting glucose as > 100 mg/dL and diabetes as > or equal to 123 mg/dL.     Calcium 9.2  8.4 - 10.2 mg/dL Final    eGFR 118  ml/min/1.73sq m Final    Narrative:     [facility] guidelines for Chronic Kidney Disease (CKD):     Stage 1 with normal or high GFR (GFR > 90 mL/min/1.73 square meters)    Stage 2 Mild CKD (GFR = 60-89 mL/min/1.73 square meters)    Stage 3A Moderate CKD (GFR = 45-59 mL/min/1.73 square meters)    Stage 3B Moderate CKD (GFR = 30-44 mL/min/1.73 square meters)    Stage 4 Severe CKD (GFR = 15-29 mL/min/1.73 square meters)    Stage 5 End Stage CKD (GFR <15 mL/min/1.73 square meters)  Note: GFR calculation is accurate only with a steady state creatinine     Time reflects when diagnosis was documented in both MDM as applicable and the Disposition within this note       Time User Action Codes Description Comment    11/12/2023  7:27 PM Cyndie Cesar Add [N30.91] Hemorrhagic cystitis           ED Disposition       ED Disposition   Discharge    Condition   Stable    Date/Time   Sun Nov 12, 2023  7:27 PM    Comment   Zoe Jj discharge to home/self care. Follow-up Information       Follow up With Specialties Details Why Contact Info Additional [address] [id number]  [address], [address] [place] [address], [address] [place] [address]          Discharge Medication List as of 11/12/2023  7:30 PM        START taking these medications    Details   cefpodoxime (VANTIN) 200 mg tablet Take 1 tablet (200 mg total) by mouth 2 (two) times a day for 14 days, Starting Sun 11/12/2023, Until Sun 11/26/2023, Normal           CONTINUE these medications which have NOT CHANGED    Details   carbamide peroxide (DEBROX) 6.5 % otic solution Administer 5 drops to the right ear 2 (two) times a day for 4 days, Starting Wed 8/4/2021, Until Sun 8/8/2021, Normal      meclizine (ANTIVERT) 12.5 MG tablet Take 1 tablet (12.5 mg total) by mouth 3 (three) times a day as needed for dizziness, Starting Fri 7/9/2021, Print           No discharge procedures on file. Prior to Admission Medications   Prescriptions Last Dose Informant Patient Reported?  Taking?   carbamide peroxide (DEBROX) 6.5 % otic solution No No   Sig: Administer 5 drops to the right ear 2 (two) times a day for 4 days   meclizine (ANTIVERT) 12.5 MG tablet   No No   Sig: Take 1 tablet (12.5 mg total) by mouth 3 (three) times a day as needed for dizziness      Facility-Administered Medications: None                        Portions of the record may have been created with voice recognition software. Occasional wrong word or "sound a like" substitutions may have occurred due to the inherent limitations of voice recognition software. Read the chart carefully and recognize, using context, where substitutions have occurred.        Nelson Patel MD  11/14/23 0157

## 2023-11-15 NOTE — ED ATTENDING ATTESTATION
11/12/2023  I, Marian Claire DO, saw and evaluated the patient. I have discussed the patient with the resident/non-physician practitioner and agree with the resident's/non-physician practitioner's findings, Plan of Care, and MDM as documented in the resident's/non-physician practitioner's note, except where noted. All available labs and Radiology studies were reviewed. I was present for key portions of any procedure(s) performed by the resident/non-physician practitioner and I was immediately available to provide assistance. At this point I agree with the current assessment done in the Emergency Department. I have conducted an independent evaluation of this patient a history and physical is as follows: 21-year-old female presents emergency department noted hematuria has been present for several days duration. The patient has noted right flank pain associated with it as well and right lower quadrant discomfort. Patient states that the pain is intermittent in nature. Differential diagnosis includes kidney stone, hemorrhagic cystitis, hematuria nonspecific. Plan will be for outpatient management and follow-up with antibiotics. ED Course  ED Course as of 11/15/23 1213   Danna Nov 12, 2023   1925 Noted hemorrhagic cystitis at this time. Plan abx and dc home.      Recommend outpatient fu            Critical Care Time  Procedures

## 2024-02-19 ENCOUNTER — TELEPHONE (OUTPATIENT)
Age: 34
End: 2024-02-19

## 2024-02-19 NOTE — TELEPHONE ENCOUNTER
Patient called in stating she received a call and thinks it is to confirm her appt , marked as confirmed and patient stated she is going to be doing a self pay visit .

## 2024-02-20 ENCOUNTER — OFFICE VISIT (OUTPATIENT)
Dept: FAMILY MEDICINE CLINIC | Facility: CLINIC | Age: 34
End: 2024-02-20

## 2024-02-20 VITALS
HEART RATE: 76 BPM | TEMPERATURE: 97.8 F | HEIGHT: 64 IN | WEIGHT: 176.6 LBS | RESPIRATION RATE: 17 BRPM | DIASTOLIC BLOOD PRESSURE: 75 MMHG | OXYGEN SATURATION: 97 % | BODY MASS INDEX: 30.15 KG/M2 | SYSTOLIC BLOOD PRESSURE: 117 MMHG

## 2024-02-20 DIAGNOSIS — Z00.00 ANNUAL PHYSICAL EXAM: ICD-10-CM

## 2024-02-20 DIAGNOSIS — K64.1 GRADE II HEMORRHOIDS: ICD-10-CM

## 2024-02-20 DIAGNOSIS — N64.4 BREAST PAIN, RIGHT: ICD-10-CM

## 2024-02-20 DIAGNOSIS — Z13.6 SCREENING FOR CARDIOVASCULAR CONDITION: ICD-10-CM

## 2024-02-20 DIAGNOSIS — Z76.89 ENCOUNTER TO ESTABLISH CARE: Primary | ICD-10-CM

## 2024-02-20 DIAGNOSIS — Z11.4 ENCOUNTER FOR SCREENING FOR HIV: ICD-10-CM

## 2024-02-20 DIAGNOSIS — N60.01 CYST OF RIGHT BREAST: ICD-10-CM

## 2024-02-20 DIAGNOSIS — R42 VERTIGO: ICD-10-CM

## 2024-02-20 DIAGNOSIS — Z11.59 ENCOUNTER FOR HEPATITIS C SCREENING TEST FOR LOW RISK PATIENT: ICD-10-CM

## 2024-02-20 DIAGNOSIS — G47.09 OTHER INSOMNIA: ICD-10-CM

## 2024-02-20 PROCEDURE — 99385 PREV VISIT NEW AGE 18-39: CPT | Performed by: PHYSICIAN ASSISTANT

## 2024-02-20 RX ORDER — HYDROCORTISONE 25 MG/G
CREAM TOPICAL 2 TIMES DAILY
COMMUNITY

## 2024-02-20 RX ORDER — MECLIZINE HCL 12.5 MG/1
12.5 TABLET ORAL EVERY 8 HOURS PRN
COMMUNITY

## 2024-02-20 RX ORDER — CHOLECALCIFEROL (VITAMIN D3) 125 MCG
1 CAPSULE ORAL
Qty: 90 TABLET | Refills: 0 | Status: SHIPPED | OUTPATIENT
Start: 2024-02-20

## 2024-02-20 NOTE — PROGRESS NOTES
ADULT ANNUAL PHYSICAL  [facility] PRIMARY CARE [facility]    NAME: Zoe Canales  AGE: 34 y.o. SEX: female  : 1990     DATE: 2024     Assessment and Plan:     Problem List Items Addressed This Visit        Digestive    Grade II hemorrhoids     No rectal bleeding. Discomfort with defecation. X1 hemorrhoid external on exam. Recommend metamucil daily and increased fiber in diet. Continue hydrocortisone topically as needed and recommend topical sitz bath.             Other    Cyst of right breast     Known fibrocystic breasts, no previous US imaging, had previous evaluation. Check US R breast. No family hx of breast CA. Recommend imaging due to increased pain.          Relevant Orders    US breast right limited (diagnostic)    TSH, 3rd generation with Free T4 reflex    Other insomnia     Since birth of her daughter. Able to fall asleep but not stay asleep, tried and failed multiple sleep teas. Discussed sleep hygiene. Recommend melatonin as needed.          Relevant Medications    Melatonin 5 MG TABS    Other Relevant Orders    TSH, 3rd generation with Free T4 reflex    Vertigo     History of dizziness episodes triggered by head movements, diagnosed with vertigo in the past. Also with dizziness associated with lack of sleep. Meclizine helps as needed.         Other Visit Diagnoses     Encounter to establish care    -  Primary    previous patient of Terra Meraz NP but has not been following due to lack of insurance    Annual physical exam        Relevant Orders    Ambulatory Referral to Obstetrics / Gynecology    Breast pain, right        suspect due to current menstrual period but with known breast cysts and change in size    Relevant Orders    US breast right limited (diagnostic)    Encounter for hepatitis C screening test for low risk patient        Relevant Orders    Hepatitis C antibody    Encounter for screening for HIV         Relevant Orders    HIV 1/2 AG/AB w Reflex [facility] for 2 yr old and above    Screening for cardiovascular condition        Relevant Orders    CBC and differential    Lipid panel    Comprehensive metabolic panel          Immunizations and preventive care screenings were discussed with patient today. Appropriate education was printed on patient's after visit summary.    Counseling:  Alcohol/drug use: discussed moderation in alcohol intake, the recommendations for healthy alcohol use, and avoidance of illicit drug use.  Dental Health: discussed importance of regular tooth brushing, flossing, and dental visits.  Injury prevention: discussed safety/seat belts, safety helmets, smoke detectors, carbon dioxide detectors, and smoking near bedding or upholstery.  Sexual health: discussed sexually transmitted diseases, partner selection, use of condoms, avoidance of unintended pregnancy, and contraceptive alternatives.  Exercise: the importance of regular exercise/physical activity was discussed. Recommend exercise 3-5 times per week for at least 30 minutes.          Return in about 1 year (around 2/20/2025), or if symptoms worsen or fail to improve, for Annual physical.     Chief Complaint:     Chief Complaint   Patient presents with   • Establish Care      History of Present Illness:     Adult Annual Physical   Patient here for a comprehensive physical exam. The patient reports problems - poor sleep . Re-establishing care. Does not desire fertility, all her issues started after last child. Same male partner. Working part-time . No smoking or ETOH use.    Diet and Physical Activity  Diet/Nutrition: well balanced diet.   Exercise: no formal exercise.      Depression Screening  PHQ-2/9 Depression Screening    Little interest or pleasure in doing things: 0 - not at all  Feeling down, depressed, or hopeless: 0 - not at all  PHQ-2 Score: 0  PHQ-2 Interpretation: Negative depression screen       General Health  Sleep:  sleeps poorly.   Hearing: normal - bilateral.  Vision: no vision problems.   Dental: no dental visits for >1 year and brushes teeth twice daily.       /GYN Health  Follows with gynecology? no   Last menstrual period: started yesterday  Contraceptive method:  none .  History of STDs?: no. One, monogamous male using condoms     Advanced Care Planning  Do you have an advanced directive? no  Do you have a durable medical power of ? no  ACP document given to the patient? no      Review of Systems:     Review of Systems   Constitutional:  Negative for chills and fever.   HENT:  Negative for ear pain and sore throat.    Eyes:  Negative for pain and visual disturbance.   Respiratory:  Negative for cough and shortness of breath.    Cardiovascular:  Negative for chest pain and palpitations.   Gastrointestinal:  Negative for abdominal pain and vomiting.   Genitourinary:  Negative for dysuria and hematuria.   Musculoskeletal:  Negative for arthralgias and back pain.   Skin:  Negative for color change and rash.   Neurological:  Positive for dizziness. Negative for seizures and syncope.   Psychiatric/Behavioral:  Positive for sleep disturbance. Negative for suicidal ideas. The patient is not nervous/anxious.    All other systems reviewed and are negative.     Past Medical History:     Past Medical History:   Diagnosis Date   • Anemia       Past Surgical History:     Past Surgical History:   Procedure Laterality Date   •  SECTION  2019      Social History:     Social History     Socioeconomic History   • Marital status: Single     Spouse name: None   • Number of children: None   • Years of education: None   • Highest education level: None   Occupational History   • None   Tobacco Use   • Smoking status: Never   • Smokeless tobacco: Never   Vaping Use   • Vaping status: Never Used   Substance and Sexual Activity   • Alcohol use: Never   • Drug use: Never   • Sexual activity: Yes     Partners: Male   Other Topics  "Concern   • None   Social History Narrative   • None     Social Determinants of Health     Financial Resource Strain: Not on file   Food Insecurity: Not on file   Transportation Needs: Not on file   Physical Activity: Not on file   Stress: Not on file   Social Connections: Not on file   Intimate Partner Violence: Not on file   Housing Stability: Not on file      Family History:     Family History   Problem Relation Age of Onset   • Hypertension Father    • Diabetes Paternal Grandfather       Current Medications:     Current Outpatient Medications   Medication Sig Dispense Refill   • hydrocortisone (ANUSOL-HC) 2.5 % rectal cream Apply topically 2 (two) times a day     • meclizine (ANTIVERT) 12.5 MG tablet Take 12.5 mg by mouth every 8 (eight) hours as needed for dizziness     • Melatonin 5 MG TABS Take 1 tablet (5 mg total) by mouth daily at bedtime 90 tablet 0     No current facility-administered medications for this visit.      Allergies:     Allergies   Allergen Reactions   • Pollen Extract Sneezing      Physical Exam:     /75 (BP Location: Left arm, Patient Position: Sitting, Cuff Size: Standard)   Pulse 76   Temp 97.8 °F (36.6 °C) (Tympanic)   Resp 17   Ht 5' 4\" (1.626 m)   Wt 80.1 kg (176 lb 9.6 oz)   SpO2 97%   BMI 30.31 kg/m²     Physical Exam  Vitals and nursing note reviewed. Exam conducted with a chaperone present (Hiral JAVED MA).   Constitutional:       General: She is not in acute distress.     Appearance: She is well-developed.   HENT:      Head: Normocephalic and atraumatic.      Right Ear: Tympanic membrane, ear canal and external ear normal.      Left Ear: Tympanic membrane, ear canal and external ear normal.   Eyes:      Conjunctiva/sclera: Conjunctivae normal.   Cardiovascular:      Rate and Rhythm: Normal rate and regular rhythm.      Heart sounds: No murmur heard.  Pulmonary:      Effort: Pulmonary effort is normal. No respiratory distress.      Breath sounds: Normal breath sounds. "   Chest:   Breasts:     Right: Mass (11 o'clock) present. No nipple discharge or skin change.      Left: Mass (lumpy, fibrocystic breast) present. No nipple discharge or skin change.   Abdominal:      Palpations: Abdomen is soft.      Tenderness: There is no abdominal tenderness.   Genitourinary:      Musculoskeletal:         General: No swelling.      Cervical back: Neck supple.   Lymphadenopathy:      Upper Body:      Right upper body: No supraclavicular, axillary or pectoral adenopathy.      Left upper body: No supraclavicular, axillary or pectoral adenopathy.   Skin:     General: Skin is warm and dry.      Capillary Refill: Capillary refill takes less than 2 seconds.   Neurological:      Mental Status: She is alert.   Psychiatric:         Mood and Affect: Mood normal.          Vivi Burton PA-C   [facility] PRIMARY CARE [facility]

## 2024-02-21 PROBLEM — K64.1 GRADE II HEMORRHOIDS: Status: ACTIVE | Noted: 2024-02-21

## 2024-02-21 PROBLEM — N60.01 CYST OF RIGHT BREAST: Status: ACTIVE | Noted: 2024-02-21

## 2024-02-21 NOTE — ASSESSMENT & PLAN NOTE
Known fibrocystic breasts, no previous US imaging, had previous evaluation. Check US R breast. No family hx of breast CA. Recommend imaging due to increased pain.

## 2024-02-21 NOTE — ASSESSMENT & PLAN NOTE
History of dizziness episodes triggered by head movements, diagnosed with vertigo in the past. Also with dizziness associated with lack of sleep. Meclizine helps as needed.

## 2024-02-21 NOTE — ASSESSMENT & PLAN NOTE
Since birth of her daughter. Able to fall asleep but not stay asleep, tried and failed multiple sleep teas. Discussed sleep hygiene. Recommend melatonin as needed.

## 2024-02-21 NOTE — ASSESSMENT & PLAN NOTE
No rectal bleeding. Discomfort with defecation. X1 hemorrhoid external on exam. Recommend metamucil daily and increased fiber in diet. Continue hydrocortisone topically as needed and recommend topical sitz bath.

## 2024-06-10 ENCOUNTER — OFFICE VISIT (OUTPATIENT)
Dept: OBGYN CLINIC | Facility: CLINIC | Age: 34
End: 2024-06-10

## 2024-06-10 VITALS
HEART RATE: 82 BPM | HEIGHT: 64 IN | WEIGHT: 164.4 LBS | DIASTOLIC BLOOD PRESSURE: 78 MMHG | BODY MASS INDEX: 28.07 KG/M2 | SYSTOLIC BLOOD PRESSURE: 117 MMHG

## 2024-06-10 DIAGNOSIS — Z01.411 ENCOUNTER FOR GYNECOLOGICAL EXAMINATION WITH ABNORMAL FINDING: Primary | ICD-10-CM

## 2024-06-10 DIAGNOSIS — N89.8 VAGINA ITCHING: ICD-10-CM

## 2024-06-10 DIAGNOSIS — Z12.39 ENCOUNTER FOR BREAST CANCER SCREENING USING NON-MAMMOGRAM MODALITY: ICD-10-CM

## 2024-06-10 DIAGNOSIS — Z12.4 SCREENING FOR CERVICAL CANCER: ICD-10-CM

## 2024-06-10 DIAGNOSIS — N63.10 BILATERAL BREAST LUMP: ICD-10-CM

## 2024-06-10 DIAGNOSIS — N63.20 BILATERAL BREAST LUMP: ICD-10-CM

## 2024-06-10 DIAGNOSIS — Z11.51 SCREENING FOR HPV (HUMAN PAPILLOMAVIRUS): ICD-10-CM

## 2024-06-10 DIAGNOSIS — B37.31 VAGINAL CANDIDIASIS: ICD-10-CM

## 2024-06-10 PROBLEM — N60.01 CYST OF RIGHT BREAST: Status: RESOLVED | Noted: 2024-02-21 | Resolved: 2024-06-10

## 2024-06-10 PROBLEM — K64.1 GRADE II HEMORRHOIDS: Status: RESOLVED | Noted: 2024-02-21 | Resolved: 2024-06-10

## 2024-06-10 PROBLEM — G47.09 OTHER INSOMNIA: Status: RESOLVED | Noted: 2024-02-20 | Resolved: 2024-06-10

## 2024-06-10 PROBLEM — R53.83 OTHER FATIGUE: Status: RESOLVED | Noted: 2021-08-04 | Resolved: 2024-06-10

## 2024-06-10 PROBLEM — R42 VERTIGO: Status: RESOLVED | Noted: 2021-08-04 | Resolved: 2024-06-10

## 2024-06-10 PROCEDURE — G0145 SCR C/V CYTO,THINLAYER,RESCR: HCPCS | Performed by: NURSE PRACTITIONER

## 2024-06-10 PROCEDURE — 99385 PREV VISIT NEW AGE 18-39: CPT | Performed by: NURSE PRACTITIONER

## 2024-06-10 PROCEDURE — G0476 HPV COMBO ASSAY CA SCREEN: HCPCS | Performed by: NURSE PRACTITIONER

## 2024-06-10 RX ORDER — FLUCONAZOLE 150 MG/1
150 TABLET ORAL ONCE
Qty: 1 TABLET | Refills: 0 | Status: SHIPPED | OUTPATIENT
Start: 2024-06-10 | End: 2024-06-10

## 2024-06-10 NOTE — PROGRESS NOTES
ANNUAL GYNECOLOGICAL EXAMINATION    Zoe Canales is a 34 y.o. female who presents today for annual GYN exam.  Her last pap smear was performed more than 5 years ago and result was WNL per patient.  She reports no history of abnormal pap smears in her past.  I have no documentation of HIV screening in her past.  She reports menses as regular.  Patient's last menstrual period was 2024 (exact date).  Her general medical history has been reviewed and she reports it as follows:    Past Medical History:   Diagnosis Date    Interstitial cystitis     Vertigo      Past Surgical History:   Procedure Laterality Date     SECTION       OB History          1    Para   1    Term   1       0    AB   0    Living   1         SAB   0    IAB   0    Ectopic   0    Multiple   0    Live Births   1               Social History     Tobacco Use    Smoking status: Never    Smokeless tobacco: Never   Vaping Use    Vaping status: Never Used   Substance Use Topics    Alcohol use: Never    Drug use: Never     Social History     Substance and Sexual Activity   Sexual Activity Yes    Partners: Male    Birth control/protection: Condom Male     Cancer-related family history includes Cancer in her paternal grandfather. There is no history of Breast cancer, Colon cancer, or Ovarian cancer.    Current Outpatient Medications   Medication Instructions    meclizine (ANTIVERT) 12.5 mg, Oral, Every 8 hours PRN       Review of Systems:  Review of Systems   Constitutional: Negative.    Gastrointestinal: Negative.    Genitourinary:  Positive for vaginal pain (itching every month the week before her menses). Negative for difficulty urinating, menstrual problem, pelvic pain and vaginal discharge.        R breast lump present for past 14 yrs feel tender and warm with itching for past 6 months (since stopped breastfeeding)   Skin: Negative.        Physical Exam:  /78 (BP Location: Right arm, Patient Position: Sitting)  "  Pulse 82   Ht 5' 4\" (1.626 m)   Wt 74.6 kg (164 lb 6.4 oz)   LMP 05/22/2024 (Exact Date)   BMI 28.22 kg/m²   Physical Exam  Constitutional:       General: She is not in acute distress.     Appearance: She is well-developed.   Genitourinary:      Vulva normal.      No lesions in the vagina.      Vaginal discharge (white, adherent) and erythema (mild) present.        Right Adnexa: not tender and no mass present.     Left Adnexa: not tender and no mass present.     No cervical motion tenderness or lesion.      Uterus is not tender.   Breasts:     Right: Mass (multiple lumps throughout breast - especially upper outer quadrant) and tenderness (upper outer quadrant) present. No nipple discharge or skin change.      Left: Mass (multiple lumps throughout breast - especially upper outer quadrant) present. No nipple discharge, skin change or tenderness.   Neck:      Thyroid: No thyromegaly.   Cardiovascular:      Rate and Rhythm: Normal rate and regular rhythm.   Pulmonary:      Effort: Pulmonary effort is normal.   Abdominal:      Palpations: Abdomen is soft.      Tenderness: There is no abdominal tenderness.   Musculoskeletal:      Cervical back: Neck supple.   Neurological:      Mental Status: She is alert and oriented to person, place, and time.   Skin:     General: Skin is warm and dry.   Vitals reviewed.       Assessment/Plan:   1. Abnormal well-woman GYN exam.  2. Cervical cancer screening:  Normal cervical exam.  Pap smear done with HPV co-testing.  Has not received HPV vaccine in the past.Offered vaccine series to patient now and she declines.     3. STD screening:  Patient declines.   4. Breast cancer screening:  Bilateral breasts lumps and R breast tenderness upper outer quadrant.  Order placed for bilateral diagnostic mammogram and ultrasound.  Reviewed breast self-awareness.   5. Depression Screening: Patient's depression screening was assessed with a PHQ-2 score of 0. Their PHQ-9 score was 0. Clinically " patient does not have depression. No treatment is required.   6. BMI Counseling: Body mass index is 28.22 kg/m².  No intervention indicated.   7. Contraception:  Declines other than condoms.   8. Vaginal candidiasis:  Given Rx Diflucan.   9. Return to office in 1 year for annual GYN exam.    Reviewed with patient that [facility], but that they will not necessarily be reviewed by me immediately.  Explained that I will review results at my earliest opportunity and contact patient appropriately.

## 2024-06-10 NOTE — PATIENT INSTRUCTIONS
Thank you for your confidence in our team.   We appreciate you and welcome your feedback.   If you receive a survey from us, please take a few moments to let us know how we are doing.   Sincerely,  CANDY Moulton

## 2024-06-10 NOTE — LETTER
2024    To Zoe Canales  : 1990      Esta carta es para informarle que travis resultados recientes de la prueba de Papanicolaou fueron revisados por mí y son NORMALES.  Nos vemos en 1 año para nash examen anual.    CANDY Moulton

## 2024-06-11 ENCOUNTER — TELEPHONE (OUTPATIENT)
Facility: HOSPITAL | Age: 34
End: 2024-06-11

## 2024-06-11 LAB
HPV HR 12 DNA CVX QL NAA+PROBE: NEGATIVE
HPV16 DNA CVX QL NAA+PROBE: NEGATIVE
HPV18 DNA CVX QL NAA+PROBE: NEGATIVE

## 2024-06-14 LAB
LAB AP GYN PRIMARY INTERPRETATION: NORMAL
Lab: NORMAL

## 2024-07-30 ENCOUNTER — HOSPITAL ENCOUNTER (OUTPATIENT)
Dept: MAMMOGRAPHY | Facility: CLINIC | Age: 34
Discharge: HOME | End: 2024-07-30

## 2024-07-30 VITALS — HEIGHT: 64 IN | BODY MASS INDEX: 28 KG/M2 | WEIGHT: 164 LBS

## 2024-07-30 DIAGNOSIS — N63.10 BILATERAL BREAST LUMP: ICD-10-CM

## 2024-07-30 DIAGNOSIS — N63.20 BILATERAL BREAST LUMP: ICD-10-CM

## 2024-07-30 PROCEDURE — 76642 ULTRASOUND BREAST LIMITED: CPT

## 2024-07-30 PROCEDURE — G0279 TOMOSYNTHESIS, MAMMO: HCPCS

## 2024-07-30 PROCEDURE — 77066 DX MAMMO INCL CAD BI: CPT

## 2025-05-23 ENCOUNTER — TELEPHONE (OUTPATIENT)
Dept: FAMILY MEDICINE CLINIC | Facility: CLINIC | Age: 35
End: 2025-05-23

## 2025-05-23 NOTE — LETTER
[facility] [address], [address]  [place] [address]  Phone#  [phone]  Fax#  [phone]      May 23, 2025      Dear:   Zoe Canales         Our office has attempted to contact you several times regarding your Appointment.  Could you please contact our office at [phone].    Thank you.     Sincerely,      Vivi Burton PA-C

## 2025-07-30 ENCOUNTER — TELEPHONE (OUTPATIENT)
Dept: FAMILY MEDICINE CLINIC | Facility: CLINIC | Age: 35
End: 2025-07-30